# Patient Record
Sex: MALE | Race: WHITE | Employment: OTHER | URBAN - NONMETROPOLITAN AREA
[De-identification: names, ages, dates, MRNs, and addresses within clinical notes are randomized per-mention and may not be internally consistent; named-entity substitution may affect disease eponyms.]

---

## 2017-01-12 ENCOUNTER — OFFICE VISIT (OUTPATIENT)
Dept: FAMILY MEDICINE CLINIC | Age: 62
End: 2017-01-12

## 2017-01-12 VITALS
RESPIRATION RATE: 8 BRPM | WEIGHT: 199.4 LBS | DIASTOLIC BLOOD PRESSURE: 72 MMHG | HEART RATE: 80 BPM | BODY MASS INDEX: 32.05 KG/M2 | SYSTOLIC BLOOD PRESSURE: 134 MMHG | HEIGHT: 66 IN

## 2017-01-12 DIAGNOSIS — G25.0 FAMILIAL TREMOR: Primary | ICD-10-CM

## 2017-01-12 DIAGNOSIS — Z23 NEED FOR INFLUENZA VACCINATION: ICD-10-CM

## 2017-01-12 DIAGNOSIS — Z12.5 SCREENING FOR PROSTATE CANCER: ICD-10-CM

## 2017-01-12 PROCEDURE — 90686 IIV4 VACC NO PRSV 0.5 ML IM: CPT | Performed by: NURSE PRACTITIONER

## 2017-01-12 PROCEDURE — G8427 DOCREV CUR MEDS BY ELIG CLIN: HCPCS | Performed by: NURSE PRACTITIONER

## 2017-01-12 PROCEDURE — 3017F COLORECTAL CA SCREEN DOC REV: CPT | Performed by: NURSE PRACTITIONER

## 2017-01-12 PROCEDURE — 90471 IMMUNIZATION ADMIN: CPT | Performed by: NURSE PRACTITIONER

## 2017-01-12 PROCEDURE — G8419 CALC BMI OUT NRM PARAM NOF/U: HCPCS | Performed by: NURSE PRACTITIONER

## 2017-01-12 PROCEDURE — 4004F PT TOBACCO SCREEN RCVD TLK: CPT | Performed by: NURSE PRACTITIONER

## 2017-01-12 PROCEDURE — G8484 FLU IMMUNIZE NO ADMIN: HCPCS | Performed by: NURSE PRACTITIONER

## 2017-01-12 PROCEDURE — 99203 OFFICE O/P NEW LOW 30 MIN: CPT | Performed by: NURSE PRACTITIONER

## 2017-01-12 RX ORDER — MAG HYDROX/ALUMINUM HYD/SIMETH 400-400-40
1 SUSPENSION, ORAL (FINAL DOSE FORM) ORAL DAILY
COMMUNITY
End: 2018-10-31

## 2017-01-12 ASSESSMENT — PATIENT HEALTH QUESTIONNAIRE - PHQ9
SUM OF ALL RESPONSES TO PHQ QUESTIONS 1-9: 0
1. LITTLE INTEREST OR PLEASURE IN DOING THINGS: 0
2. FEELING DOWN, DEPRESSED OR HOPELESS: 0
SUM OF ALL RESPONSES TO PHQ9 QUESTIONS 1 & 2: 0

## 2017-01-12 ASSESSMENT — ENCOUNTER SYMPTOMS
RESPIRATORY NEGATIVE: 1
GASTROINTESTINAL NEGATIVE: 1
EYES NEGATIVE: 1

## 2017-01-14 LAB
ABSOLUTE BASO #: 0.1 K/UL (ref 0–0.1)
ABSOLUTE EOS #: 0.4 K/UL (ref 0.1–0.4)
ABSOLUTE LYMPH #: 1.7 K/UL (ref 0.8–5.2)
ABSOLUTE MONO #: 0.8 K/UL (ref 0.1–0.9)
ABSOLUTE NEUT #: 3.9 K/UL (ref 1.3–9.1)
ALBUMIN SERPL-MCNC: 4.6 G/DL (ref 3.2–5.3)
ALK PHOSPHATASE: 53 IU/L (ref 35–121)
ALT SERPL-CCNC: 19 IU/L (ref 5–59)
ANION GAP SERPL CALCULATED.3IONS-SCNC: 12 MMOL/L
AST SERPL-CCNC: 15 IU/L (ref 10–42)
BASOPHILS RELATIVE PERCENT: 0.9 % (ref 0–1)
BILIRUB SERPL-MCNC: 0.5 MG/DL (ref 0.2–1.3)
BUN BLDV-MCNC: 15 MG/DL (ref 10–20)
CALCIUM SERPL-MCNC: 9.5 MG/DL (ref 8.7–10.8)
CHLORIDE BLD-SCNC: 102 MMOL/L (ref 95–111)
CHOLESTEROL/HDL RATIO: 4
CHOLESTEROL: 221 MG/DL
CO2: 29 MMOL/L (ref 21–32)
CREAT SERPL-MCNC: 0.8 MG/DL (ref 0.5–1.3)
EGFR AFRICAN AMERICAN: 118
EGFR IF NONAFRICAN AMERICAN: 98
EOSINOPHILS RELATIVE PERCENT: 5.3 % (ref 1–4)
FOLATE: 18.9 NG/ML
GLUCOSE: 92 MG/DL (ref 70–100)
HCT VFR BLD CALC: 40 % (ref 41.4–51)
HDLC SERPL-MCNC: 55 MG/DL (ref 40–60)
HEMOGLOBIN: 13.7 G/DL (ref 13.8–17)
LDL CHOLESTEROL CALCULATED: 137 MG/DL
LDL/HDL RATIO: 2.5
LYMPHOCYTE %: 24.3 % (ref 16–48)
MCH RBC QN AUTO: 29.2 PG (ref 27–34)
MCHC RBC AUTO-ENTMCNC: 34.3 G/DL (ref 31–36)
MCV RBC AUTO: 85.3 FL (ref 80–100)
MONOCYTES # BLD: 11 % (ref 1–8)
NEUTROPHILS RELATIVE PERCENT: 57.8 % (ref 45–75)
PDW BLD-RTO: 13 % (ref 10.8–14.8)
PLATELETS: 225 K/UL (ref 150–450)
POTASSIUM SERPL-SCNC: 4.3 MMOL/L (ref 3.5–5.4)
PSA, ULTRASENSITIVE: 0.46 NG/ML
RBC: 4.69 M/UL (ref 4–5.5)
SODIUM BLD-SCNC: 139 MMOL/L (ref 134–147)
TOTAL PROTEIN: 7.1 G/DL (ref 5.8–8)
TRIGL SERPL-MCNC: 144 MG/DL
TSH SERPL DL<=0.05 MIU/L-ACNC: 3.84 UIU/ML (ref 0.4–4.4)
VITAMIN B-12: 837 PG/ML (ref 211–911)
VLDLC SERPL CALC-MCNC: 29 MG/DL
WBC: 6.8 K/UL (ref 3.7–10.8)

## 2017-01-16 ENCOUNTER — TELEPHONE (OUTPATIENT)
Dept: FAMILY MEDICINE CLINIC | Age: 62
End: 2017-01-16

## 2017-01-16 DIAGNOSIS — E78.5 HYPERLIPIDEMIA, UNSPECIFIED HYPERLIPIDEMIA TYPE: Primary | ICD-10-CM

## 2017-01-16 RX ORDER — ATORVASTATIN CALCIUM 20 MG/1
20 TABLET, FILM COATED ORAL DAILY
Qty: 30 TABLET | Refills: 11 | Status: SHIPPED | OUTPATIENT
Start: 2017-01-16 | End: 2021-08-06 | Stop reason: SDUPTHER

## 2017-01-25 ENCOUNTER — INITIAL CONSULT (OUTPATIENT)
Dept: PHYSICAL MEDICINE AND REHAB | Age: 62
End: 2017-01-25

## 2017-01-25 VITALS
BODY MASS INDEX: 32.14 KG/M2 | SYSTOLIC BLOOD PRESSURE: 145 MMHG | HEIGHT: 66 IN | DIASTOLIC BLOOD PRESSURE: 82 MMHG | WEIGHT: 200 LBS | HEART RATE: 82 BPM

## 2017-01-25 DIAGNOSIS — G25.0 BENIGN ESSENTIAL TREMOR: Primary | ICD-10-CM

## 2017-01-25 PROCEDURE — G8419 CALC BMI OUT NRM PARAM NOF/U: HCPCS | Performed by: PSYCHIATRY & NEUROLOGY

## 2017-01-25 PROCEDURE — 99244 OFF/OP CNSLTJ NEW/EST MOD 40: CPT | Performed by: PSYCHIATRY & NEUROLOGY

## 2017-01-25 PROCEDURE — 3017F COLORECTAL CA SCREEN DOC REV: CPT | Performed by: PSYCHIATRY & NEUROLOGY

## 2017-01-25 PROCEDURE — G8427 DOCREV CUR MEDS BY ELIG CLIN: HCPCS | Performed by: PSYCHIATRY & NEUROLOGY

## 2017-01-25 PROCEDURE — 4004F PT TOBACCO SCREEN RCVD TLK: CPT | Performed by: PSYCHIATRY & NEUROLOGY

## 2017-01-25 PROCEDURE — G8484 FLU IMMUNIZE NO ADMIN: HCPCS | Performed by: PSYCHIATRY & NEUROLOGY

## 2017-01-25 RX ORDER — PRIMIDONE 50 MG/1
50 TABLET ORAL NIGHTLY
Qty: 30 TABLET | Refills: 1 | Status: SHIPPED | OUTPATIENT
Start: 2017-01-25 | End: 2017-01-31 | Stop reason: SDUPTHER

## 2017-01-31 ENCOUNTER — TELEPHONE (OUTPATIENT)
Dept: PHYSICAL MEDICINE AND REHAB | Age: 62
End: 2017-01-31

## 2017-01-31 RX ORDER — PRIMIDONE 50 MG/1
50 TABLET ORAL 2 TIMES DAILY
Qty: 60 TABLET | Refills: 1 | Status: SHIPPED | OUTPATIENT
Start: 2017-01-31 | End: 2017-04-03

## 2017-04-03 ENCOUNTER — OFFICE VISIT (OUTPATIENT)
Dept: PHYSICAL MEDICINE AND REHAB | Age: 62
End: 2017-04-03

## 2017-04-03 VITALS
WEIGHT: 205.8 LBS | HEIGHT: 66 IN | HEART RATE: 70 BPM | SYSTOLIC BLOOD PRESSURE: 145 MMHG | BODY MASS INDEX: 33.07 KG/M2 | DIASTOLIC BLOOD PRESSURE: 78 MMHG

## 2017-04-03 DIAGNOSIS — G25.0 BENIGN ESSENTIAL TREMOR: Primary | ICD-10-CM

## 2017-04-03 PROCEDURE — G8417 CALC BMI ABV UP PARAM F/U: HCPCS | Performed by: PSYCHIATRY & NEUROLOGY

## 2017-04-03 PROCEDURE — 4004F PT TOBACCO SCREEN RCVD TLK: CPT | Performed by: PSYCHIATRY & NEUROLOGY

## 2017-04-03 PROCEDURE — 99213 OFFICE O/P EST LOW 20 MIN: CPT | Performed by: PSYCHIATRY & NEUROLOGY

## 2017-04-03 PROCEDURE — G8427 DOCREV CUR MEDS BY ELIG CLIN: HCPCS | Performed by: PSYCHIATRY & NEUROLOGY

## 2017-04-03 PROCEDURE — 3017F COLORECTAL CA SCREEN DOC REV: CPT | Performed by: PSYCHIATRY & NEUROLOGY

## 2017-04-03 RX ORDER — PRIMIDONE 50 MG/1
50 TABLET ORAL 3 TIMES DAILY
Qty: 90 TABLET | Refills: 5 | Status: SHIPPED | OUTPATIENT
Start: 2017-04-03 | End: 2017-10-05 | Stop reason: SDUPTHER

## 2017-10-05 ENCOUNTER — OFFICE VISIT (OUTPATIENT)
Dept: NEUROLOGY | Age: 62
End: 2017-10-05
Payer: COMMERCIAL

## 2017-10-05 VITALS
HEART RATE: 69 BPM | SYSTOLIC BLOOD PRESSURE: 127 MMHG | BODY MASS INDEX: 32.47 KG/M2 | WEIGHT: 202 LBS | HEIGHT: 66 IN | DIASTOLIC BLOOD PRESSURE: 72 MMHG

## 2017-10-05 DIAGNOSIS — G25.0 BENIGN ESSENTIAL TREMOR: Primary | ICD-10-CM

## 2017-10-05 PROCEDURE — 4004F PT TOBACCO SCREEN RCVD TLK: CPT | Performed by: PSYCHIATRY & NEUROLOGY

## 2017-10-05 PROCEDURE — 3017F COLORECTAL CA SCREEN DOC REV: CPT | Performed by: PSYCHIATRY & NEUROLOGY

## 2017-10-05 PROCEDURE — G8427 DOCREV CUR MEDS BY ELIG CLIN: HCPCS | Performed by: PSYCHIATRY & NEUROLOGY

## 2017-10-05 PROCEDURE — G8417 CALC BMI ABV UP PARAM F/U: HCPCS | Performed by: PSYCHIATRY & NEUROLOGY

## 2017-10-05 PROCEDURE — G8484 FLU IMMUNIZE NO ADMIN: HCPCS | Performed by: PSYCHIATRY & NEUROLOGY

## 2017-10-05 PROCEDURE — 99213 OFFICE O/P EST LOW 20 MIN: CPT | Performed by: PSYCHIATRY & NEUROLOGY

## 2017-10-05 RX ORDER — PRIMIDONE 50 MG/1
100 TABLET ORAL 2 TIMES DAILY
Qty: 120 TABLET | Refills: 7 | Status: SHIPPED | OUTPATIENT
Start: 2017-10-05 | End: 2018-04-24 | Stop reason: SDUPTHER

## 2017-10-05 NOTE — MR AVS SNAPSHOT
After Visit Summary             [address]   10/5/2017 2:45 PM   Office Visit    Description:  Male : 1955   Provider:  Eric Deluca MD   Department:  [facility]              Your Follow-Up and Future Appointments         Below is a list of your follow-up and future appointments. This may not be a complete list as you may have made appointments directly with providers that we are not aware of or your providers may have made some for you. Please call your providers to confirm appointments. It is important to keep your appointments. Please bring your current insurance card, photo ID, co-pay, and all medication bottles to your appointment. If self-pay, payment is expected at the time of service. Your To-Do List     Future Appointments Provider Department Dept Phone    2018 12:30 PM Eric Deluca MD [facility] [phone]    Please arrive 15 minutes prior to appointment, bring photo ID and insurance card. Follow-Up    Return in about 6 months (around 2018). Information from Your Visit        Department     Name Address Phone Fax    [facility] [place] [address]      You Were Seen for:         Comments    Benign essential tremor   [[id number]]         Vital Signs     Blood Pressure Pulse Height Weight Body Mass Index Smoking Status    127/72 (Site: Right Arm, Position: Sitting, Cuff Size: Medium Adult) 71 5' 6\" (1.676 m) 202 lb (91.6 kg) 32.6 kg/m2 Former Smoker      Additional Information about your Body Mass Index (BMI)           Your BMI as listed above is considered obese (30 or more). BMI is an estimate of body fat, calculated from your height and weight. The higher your BMI, the greater your risk of heart disease, high blood pressure, type 2 diabetes, stroke, gallstones, arthritis, sleep apnea, and certain cancers. BMI is not perfect.   It may overestimate body fat in athletes and Influenza, Quadv, 3 yrs and older, IM, Preservative Free 1/12/2017      Preventive Care        Date Due    Hepatitis C screening is recommended for all adults regardless of risk factors born between [facility] at least once (lifetime) who have never been tested. 1955    HIV screening is recommended for all people regardless of risk factors  aged 15-65 years at least once (lifetime) who have never been HIV tested. 1/14/1968    Tetanus Combination Vaccine (1 - Tdap) 1/14/1972    Colonoscopy 1/14/2003    Zoster Vaccine 1/14/2013    Yearly Flu Vaccine (1) 9/1/2017    Cholesterol Screening 1/13/2022            MyChart Signup           Our records indicate that you have declined MyChart signup.

## 2017-10-05 NOTE — PATIENT INSTRUCTIONS
1. Change Primidone to 100 mg twice a day. 2. Follow up in 6 months or sooner if needed. 3. Call if any questions or concerns.

## 2017-10-05 NOTE — PROGRESS NOTES
NEUROLOGY OUT PATIENT FOLLOW UP NOTE:  10/5/22283:18 PM    Milady Silverio is here for follow up for   Patient Active Problem List   Diagnosis    Familial tremor      Follow-up for benign essential tremor . He reports increased tremor, he responds well to twice a day of primidone. He denies new symptoms. He denies any falls, head or voice tremor. ROS:  Respiratory : no cough, no hemoptysis. Cardiac: no chest pain. No palpitations. Renal : no flank pain, no hematuria    Skin: no rash  Reviewed labs since last evaluation and discussed with patient. No Known Allergies    Current Outpatient Prescriptions:     primidone (MYSOLINE) 50 MG tablet, Take 1 tablet by mouth 3 times daily, Disp: 90 tablet, Rfl: 5    atorvastatin (LIPITOR) 20 MG tablet, Take 1 tablet by mouth daily, Disp: 30 tablet, Rfl: 11    Multiple Vitamins-Minerals (CENTRUM SILVER PO), Take 1 tablet by mouth daily, Disp: , Rfl:     NONFORMULARY, 1 tablet daily Indications: protandim(NRF2), Disp: , Rfl:     Saw [place] 450 MG CAPS, Take 1 tablet by mouth daily, Disp: , Rfl:       PE:   Vitals:    10/05/17 1508   BP: 127/72   Site: Right Arm   Position: Sitting   Cuff Size: Medium Adult   Pulse: 69   Weight: 202 lb (91.6 kg)   Height: 5' 6\" (1.676 m)     General Appearance:  alert, cooperative and obese  Skin:  Skin color, texture, turgor normal. No rashes or lesions. Gen: AO3, NAD, Language is Intact. Head: PERRL, EOMI, no icterus  Neck: There is no carotid bruits. The Neck is supple. Neuro: CN 2-12 grossly intact with no focal deficits. Power 5/5 Throughout symmetric, Reflexes are decreased and symmetric. Long tracts are intact. Cerebellar exam is Intact. Sensory exam is intact to light touch. Gait is intact. There is symmetric action tremor noted. There is no head tremor. No voice tremor. Musculoskeletal:  Has no hand arthritis, no limitation of ROM in any of the four extremities. The abdomen is soft, non tender.    Lower extremities no edema        DATA:  Results for orders placed or performed in visit on 01/12/17   Lipid Panel   Result Value Ref Range    Cholesterol 221 (H) <200 mg/dl    Triglycerides 144 <150 mg/dl    HDL 55 40 - 60 mg/dl    LDL Calculated 137 (H) <130 mg/dL    VLDL CHOLESTEROL CALCULATED 29 <30 mg/dL    LDl/HDL Ratio 2.5 <3.5    Chol/HDL Ratio 4.0 <5   Vitamin B12 & Folate   Result Value Ref Range    Vitamin B-12 837 211 - 911 pg/ml    Folate 18.90 ng/ml   Comprehensive Metabolic Panel   Result Value Ref Range    Glucose 92 70 - 100 mg/dl    BUN 15 10 - 20 mg/dl    CREATININE 0.8 0.5 - 1.3 mg/dl    eGFR African American 118 >60    EGFR IF NonAfrican American 98 >60    Calcium 9.5 8.7 - 10.8 mg/dl    Sodium 139 134 - 147 mmol/L    Potassium 4.3 3.5 - 5.4 mmol/L    Chloride 102 95 - 111 mmol/L    CO2 29 21 - 32 mmol/L    Anion Gap 12     Total Bilirubin 0.5 0.2 - 1.3 mg/dl    Alk Phosphatase 53 35 - 121 IU/L    AST 15 10 - 42 IU/L    ALT 19 5 - 59 IU/L    Total Protein 7.1 5.8 - 8.0 g/dl    Alb 4.6 3.2 - 5.3 g/dl   CBC   Result Value Ref Range    WBC 6.8 3.7 - 10.8 K/ul    RBC 4.69 4.00 - 5.50 M/ul    Hemoglobin 13.7 (L) 13.8 - 17.0 g/dL    Hematocrit 40.0 (L) 41.4 - 51.0 %    MCV 85.3 80 - 100 fl    MCH 29.2 27.0 - 34.0 pg    MCHC 34.3 31.0 - 36.0 g/dl    RDW 13.0 10.8 - 14.8 %    Platelets 763 971 - 285 K/ul    Absolute Neut # 3.9 1.3 - 9.1 K/ul    Neutrophils % 57.8 45 - 75 %    Absolute Lymph # 1.7 0.8 - 5.2 K/ul    Lymphocyte % 24.3 16 - 48 %    Absolute Mono # 0.8 0.1 - 0.9 K/ul    Monocytes 11.0 (H) 1 - 8 %    Absolute Eos # 0.4 0.1 - 0.4 K/ul    Eosinophils % 5.3 (H) 1 - 4 %    Absolute Baso # 0.1 0.0 - 0.1 K/ul    Basophils % 0.9 0 - 1 %   PSA 3rd Generation   Result Value Ref Range    PSA, Ultrasensitive 0.46 <4.0 ng/ml   TSH without Reflex   Result Value Ref Range    TSH 3.840 0.40 - 4.40 uIU/mL           Assessment:    1. Benign essential tremor          He reports increased tremor.  No side effects to the medications. His exam shows symmetric action tremor. There is not head or voice tremor. After detailed discussion with patient and his wife we agreed on the following plan. Plan:  1. Change Primidone to 100 mg twice a day. 2. Follow up in 6 months or sooner if needed. 3. Call if any questions or concerns. Please call if any questions.      Abena Gracia MD

## 2018-04-24 ENCOUNTER — OFFICE VISIT (OUTPATIENT)
Dept: NEUROLOGY | Age: 63
End: 2018-04-24
Payer: MEDICARE

## 2018-04-24 VITALS
BODY MASS INDEX: 33.43 KG/M2 | HEART RATE: 68 BPM | WEIGHT: 208 LBS | DIASTOLIC BLOOD PRESSURE: 84 MMHG | SYSTOLIC BLOOD PRESSURE: 128 MMHG | HEIGHT: 66 IN

## 2018-04-24 DIAGNOSIS — G25.0 BENIGN ESSENTIAL TREMOR: Primary | ICD-10-CM

## 2018-04-24 PROCEDURE — 3017F COLORECTAL CA SCREEN DOC REV: CPT | Performed by: PSYCHIATRY & NEUROLOGY

## 2018-04-24 PROCEDURE — 1123F ACP DISCUSS/DSCN MKR DOCD: CPT | Performed by: PSYCHIATRY & NEUROLOGY

## 2018-04-24 PROCEDURE — 99213 OFFICE O/P EST LOW 20 MIN: CPT | Performed by: PSYCHIATRY & NEUROLOGY

## 2018-04-24 PROCEDURE — 4004F PT TOBACCO SCREEN RCVD TLK: CPT | Performed by: PSYCHIATRY & NEUROLOGY

## 2018-04-24 PROCEDURE — 4040F PNEUMOC VAC/ADMIN/RCVD: CPT | Performed by: PSYCHIATRY & NEUROLOGY

## 2018-04-24 PROCEDURE — G8417 CALC BMI ABV UP PARAM F/U: HCPCS | Performed by: PSYCHIATRY & NEUROLOGY

## 2018-04-24 PROCEDURE — G8427 DOCREV CUR MEDS BY ELIG CLIN: HCPCS | Performed by: PSYCHIATRY & NEUROLOGY

## 2018-04-24 RX ORDER — PRIMIDONE 50 MG/1
100 TABLET ORAL 2 TIMES DAILY
Qty: 120 TABLET | Refills: 7 | Status: SHIPPED | OUTPATIENT
Start: 2018-04-24 | End: 2019-04-30 | Stop reason: SDUPTHER

## 2018-04-24 RX ORDER — GABAPENTIN 300 MG/1
300 CAPSULE ORAL DAILY
Qty: 30 CAPSULE | Refills: 3 | Status: SHIPPED | OUTPATIENT
Start: 2018-04-24 | End: 2018-08-22 | Stop reason: SDUPTHER

## 2018-08-22 RX ORDER — GABAPENTIN 300 MG/1
CAPSULE ORAL
Qty: 30 CAPSULE | Refills: 3 | Status: SHIPPED | OUTPATIENT
Start: 2018-08-22 | End: 2018-10-31 | Stop reason: SDUPTHER

## 2018-10-31 ENCOUNTER — OFFICE VISIT (OUTPATIENT)
Dept: NEUROLOGY | Age: 63
End: 2018-10-31
Payer: MEDICARE

## 2018-10-31 VITALS
DIASTOLIC BLOOD PRESSURE: 70 MMHG | SYSTOLIC BLOOD PRESSURE: 132 MMHG | WEIGHT: 214 LBS | HEIGHT: 66 IN | HEART RATE: 68 BPM | BODY MASS INDEX: 34.39 KG/M2

## 2018-10-31 DIAGNOSIS — G25.0 BENIGN ESSENTIAL TREMOR: Primary | ICD-10-CM

## 2018-10-31 PROCEDURE — 99213 OFFICE O/P EST LOW 20 MIN: CPT | Performed by: PSYCHIATRY & NEUROLOGY

## 2018-10-31 PROCEDURE — 3017F COLORECTAL CA SCREEN DOC REV: CPT | Performed by: PSYCHIATRY & NEUROLOGY

## 2018-10-31 PROCEDURE — 1123F ACP DISCUSS/DSCN MKR DOCD: CPT | Performed by: PSYCHIATRY & NEUROLOGY

## 2018-10-31 PROCEDURE — 4004F PT TOBACCO SCREEN RCVD TLK: CPT | Performed by: PSYCHIATRY & NEUROLOGY

## 2018-10-31 PROCEDURE — G8484 FLU IMMUNIZE NO ADMIN: HCPCS | Performed by: PSYCHIATRY & NEUROLOGY

## 2018-10-31 PROCEDURE — 4040F PNEUMOC VAC/ADMIN/RCVD: CPT | Performed by: PSYCHIATRY & NEUROLOGY

## 2018-10-31 PROCEDURE — G8427 DOCREV CUR MEDS BY ELIG CLIN: HCPCS | Performed by: PSYCHIATRY & NEUROLOGY

## 2018-10-31 PROCEDURE — G8417 CALC BMI ABV UP PARAM F/U: HCPCS | Performed by: PSYCHIATRY & NEUROLOGY

## 2018-10-31 PROCEDURE — 1101F PT FALLS ASSESS-DOCD LE1/YR: CPT | Performed by: PSYCHIATRY & NEUROLOGY

## 2018-10-31 RX ORDER — GABAPENTIN 300 MG/1
CAPSULE ORAL
Qty: 60 CAPSULE | Refills: 5 | Status: SHIPPED | OUTPATIENT
Start: 2018-10-31 | End: 2019-05-13 | Stop reason: SDUPTHER

## 2019-04-30 DIAGNOSIS — G25.0 BENIGN ESSENTIAL TREMOR: Primary | ICD-10-CM

## 2019-04-30 RX ORDER — PRIMIDONE 50 MG/1
100 TABLET ORAL 2 TIMES DAILY
Qty: 120 TABLET | Refills: 7 | Status: SHIPPED | OUTPATIENT
Start: 2019-04-30 | End: 2019-05-21 | Stop reason: SDUPTHER

## 2019-05-13 ENCOUNTER — OFFICE VISIT (OUTPATIENT)
Dept: NEUROLOGY | Age: 64
End: 2019-05-13
Payer: MEDICARE

## 2019-05-13 VITALS
WEIGHT: 217 LBS | DIASTOLIC BLOOD PRESSURE: 76 MMHG | HEIGHT: 66 IN | SYSTOLIC BLOOD PRESSURE: 128 MMHG | HEART RATE: 68 BPM | BODY MASS INDEX: 34.87 KG/M2

## 2019-05-13 DIAGNOSIS — G25.0 BENIGN ESSENTIAL TREMOR: Primary | ICD-10-CM

## 2019-05-13 PROCEDURE — G8417 CALC BMI ABV UP PARAM F/U: HCPCS | Performed by: PSYCHIATRY & NEUROLOGY

## 2019-05-13 PROCEDURE — 4040F PNEUMOC VAC/ADMIN/RCVD: CPT | Performed by: PSYCHIATRY & NEUROLOGY

## 2019-05-13 PROCEDURE — 1123F ACP DISCUSS/DSCN MKR DOCD: CPT | Performed by: PSYCHIATRY & NEUROLOGY

## 2019-05-13 PROCEDURE — 99213 OFFICE O/P EST LOW 20 MIN: CPT | Performed by: PSYCHIATRY & NEUROLOGY

## 2019-05-13 PROCEDURE — G8427 DOCREV CUR MEDS BY ELIG CLIN: HCPCS | Performed by: PSYCHIATRY & NEUROLOGY

## 2019-05-13 PROCEDURE — 3017F COLORECTAL CA SCREEN DOC REV: CPT | Performed by: PSYCHIATRY & NEUROLOGY

## 2019-05-13 PROCEDURE — 4004F PT TOBACCO SCREEN RCVD TLK: CPT | Performed by: PSYCHIATRY & NEUROLOGY

## 2019-05-13 RX ORDER — GABAPENTIN 300 MG/1
CAPSULE ORAL
Qty: 90 CAPSULE | Refills: 2 | Status: SHIPPED | OUTPATIENT
Start: 2019-05-13 | End: 2019-09-05 | Stop reason: SDUPTHER

## 2019-05-21 DIAGNOSIS — G25.0 BENIGN ESSENTIAL TREMOR: Primary | ICD-10-CM

## 2019-05-21 RX ORDER — PRIMIDONE 50 MG/1
100 TABLET ORAL 2 TIMES DAILY
Qty: 360 TABLET | Refills: 2 | Status: SHIPPED | OUTPATIENT
Start: 2019-05-21 | End: 2019-12-02 | Stop reason: SDUPTHER

## 2019-05-27 NOTE — PROGRESS NOTES
NEUROLOGY OUT PATIENT FOLLOW UP NOTE: 5/13/20191:03 PM  ?  Nohelia Ramosan is here for follow up for benign essential tremor. He feels his symptoms are worse since last evaluation. He can struggle with using utensils to eat. He is currently on primidone and Neurontin. No side effects to the medication. He comes in with his wife to discuss medication options and the plan of care going forward. ?  ?  ?  ROS:  Cardiac: no chest pain. No palpitations. Renal : no flank pain, no hematuria   Skin: no rash  ? No Known Allergies  Current Outpatient Medications:    primidone (MYSOLINE) 50 MG tablet, Take 2 tablets by mouth 2 times daily, Disp: 120 tablet, Rfl: 7   gabapentin (NEURONTIN) 300 MG capsule, Take one capsule twice a day. ., Disp: 60 capsule, Rfl: 5   Multiple Vitamins-Minerals (CENTRUM SILVER PO), Take 1 tablet by mouth daily, Disp: , Rfl:    NONFORMULARY, 1 tablet daily Indications: protandim(NRF2), Disp: , Rfl:    atorvastatin (LIPITOR) 20 MG tablet, Take 1 tablet by mouth daily, Disp: 30 tablet, Rfl: 11  ? PE:   Vitals:   ? 05/13/19 1255   BP: 128/76   Site: Left Upper Arm   Position: Sitting   Cuff Size: Medium Adult   Pulse: 68   Weight: 217 lb (98.4 kg)   Height: 5' 6\" (1.676 m)   General Appearance: awake, alert, oriented, in no acute distress  Gen: NAD, Language is Intact. Head: no rash, no icterus  Neck: There is no carotid bruits. The Neck is supple. Neuro: CN 2-12 grossly intact with no focal deficits. Power 5/5 Throughout symmetric, Reflexes are decreased and symmetric. Long tracts are intact. Cerebellar exam is Intact. Sensory exam is intact to light touch. Gait is intact. He has symmetric bilateral action tremor noted on exam. No head or voice tremor. Musculoskeletal: Has no hand arthritis, no limitation of ROM in any of the four extremities. Lower extremities no edema  ? ? DATA:  Results for orders placed or performed in visit on 01/12/17   Lipid Panel   Result Value Ref Range   ? Cholesterol 221 (H) <200 mg/dl   ? Triglycerides 144 <150 mg/dl   ? HDL 55 40 - 60 mg/dl   ? LDL Calculated 137 (H) <130 mg/dL   ? VLDL Cholesterol Calculated 29 <30 mg/dL   ? LDl/HDL Ratio 2.5 <3.5   ? Chol/HDL Ratio 4.0 <5   Vitamin B12 & Folate   Result Value Ref Range   ? Vitamin B-12 837 211 - 911 pg/ml   ? Folate 18.90 ng/ml   Comprehensive Metabolic Panel   Result Value Ref Range   ? Glucose 92 70 - 100 mg/dl   ? BUN 15 10 - 20 mg/dl   ? CREATININE 0.8 0.5 - 1.3 mg/dl   ? eGFR  118 >60   ? EGFR IF NonAfrican American 98 >60   ? Calcium 9.5 8.7 - 10.8 mg/dl   ? Sodium 139 134 - 147 mmol/L   ? Potassium 4.3 3.5 - 5.4 mmol/L   ? Chloride 102 95 - 111 mmol/L   ? CO2 29 21 - 32 mmol/L   ? Anion Gap 12 ?   ? Total Bilirubin 0.5 0.2 - 1.3 mg/dl   ? Alk Phosphatase 53 35 - 121 IU/L   ? AST 15 10 - 42 IU/L   ? ALT 19 5 - 59 IU/L   ? Total Protein 7.1 5.8 - 8.0 g/dl   ? Alb 4.6 3.2 - 5.3 g/dl   CBC   Result Value Ref Range   ? WBC 6.8 3.7 - 10.8 K/ul   ? RBC 4.69 4.00 - 5.50 M/ul   ? Hemoglobin 13.7 (L) 13.8 - 17.0 g/dL   ? Hematocrit 40.0 (L) 41.4 - 51.0 %   ? MCV 85.3 80 - 100 fl   ? MCH 29.2 27.0 - 34.0 pg   ? MCHC 34.3 31.0 - 36.0 g/dl   ? RDW 13.0 10.8 - 14.8 %   ? Platelets 771 304 - 244 K/ul   ? Absolute Neut # 3.9 1.3 - 9.1 K/ul   ? Neutrophils % 57.8 45 - 75 %   ? Absolute Lymph # 1.7 0.8 - 5.2 K/ul   ? Lymphocyte % 24.3 16 - 48 %   ? Absolute Mono # 0.8 0.1 - 0.9 K/ul   ? Monocytes 11.0 (H) 1 - 8 %   ? Absolute Eos # 0.4 0.1 - 0.4 K/ul   ? Eosinophils % 5.3 (H) 1 - 4 %   ? Absolute Baso # 0.1 0.0 - 0.1 K/ul   ? Basophils % 0.9 0 - 1 %   PSA 3rd Generation   Result Value Ref Range   ? PSA, Ultrasensitive 0.46 <4.0 ng/ml   TSH without Reflex   Result Value Ref Range   ? TSH 3.840 0.40 - 4.40 uIU/mL   ? ? Assessment:  ? Diagnosis Orders   1. Benign essential tremor  ? ? He is here for follow up for benign essential tremor. He feels his symptoms are worse since last evaluation.  He can struggle with using utensils to eat. He is currently on primidone and Neurontin. No side effects to the medication. There is no head or voice tremor. After detailed discussion with patient and his wife we agreed on the following plan. ?  ?  Plan:  1. Change Neurontin to 300 mg three times?daily. 2. Continue Primidone 100 mg twice a day. Refills given. 3. Follow up in 6 months or sooner if needed. 4. Call if any questions or concerns. ?  ?  Please call if any questions.        Merrill Walton MD

## 2019-09-05 DIAGNOSIS — G25.0 BENIGN ESSENTIAL TREMOR: Primary | ICD-10-CM

## 2019-09-05 RX ORDER — GABAPENTIN 300 MG/1
CAPSULE ORAL
Qty: 270 CAPSULE | Refills: 1 | Status: SHIPPED | OUTPATIENT
Start: 2019-09-05 | End: 2019-12-02

## 2019-12-02 ENCOUNTER — OFFICE VISIT (OUTPATIENT)
Dept: NEUROLOGY | Age: 64
End: 2019-12-02
Payer: MEDICARE

## 2019-12-02 VITALS
DIASTOLIC BLOOD PRESSURE: 80 MMHG | HEART RATE: 76 BPM | WEIGHT: 218 LBS | HEIGHT: 63 IN | BODY MASS INDEX: 38.62 KG/M2 | SYSTOLIC BLOOD PRESSURE: 122 MMHG

## 2019-12-02 DIAGNOSIS — G25.0 BENIGN ESSENTIAL TREMOR: Primary | ICD-10-CM

## 2019-12-02 PROCEDURE — G8484 FLU IMMUNIZE NO ADMIN: HCPCS | Performed by: PSYCHIATRY & NEUROLOGY

## 2019-12-02 PROCEDURE — G8427 DOCREV CUR MEDS BY ELIG CLIN: HCPCS | Performed by: PSYCHIATRY & NEUROLOGY

## 2019-12-02 PROCEDURE — 99213 OFFICE O/P EST LOW 20 MIN: CPT | Performed by: PSYCHIATRY & NEUROLOGY

## 2019-12-02 PROCEDURE — 4040F PNEUMOC VAC/ADMIN/RCVD: CPT | Performed by: PSYCHIATRY & NEUROLOGY

## 2019-12-02 PROCEDURE — 3017F COLORECTAL CA SCREEN DOC REV: CPT | Performed by: PSYCHIATRY & NEUROLOGY

## 2019-12-02 PROCEDURE — 1123F ACP DISCUSS/DSCN MKR DOCD: CPT | Performed by: PSYCHIATRY & NEUROLOGY

## 2019-12-02 PROCEDURE — 4004F PT TOBACCO SCREEN RCVD TLK: CPT | Performed by: PSYCHIATRY & NEUROLOGY

## 2019-12-02 PROCEDURE — G8417 CALC BMI ABV UP PARAM F/U: HCPCS | Performed by: PSYCHIATRY & NEUROLOGY

## 2019-12-02 RX ORDER — PRIMIDONE 50 MG/1
100 TABLET ORAL 2 TIMES DAILY
Qty: 360 TABLET | Refills: 2 | Status: SHIPPED | OUTPATIENT
Start: 2019-12-02 | End: 2020-12-11 | Stop reason: SDUPTHER

## 2019-12-02 RX ORDER — GABAPENTIN 400 MG/1
400 CAPSULE ORAL 3 TIMES DAILY
Qty: 90 CAPSULE | Refills: 9 | Status: SHIPPED | OUTPATIENT
Start: 2019-12-02 | End: 2020-06-03

## 2020-06-03 ENCOUNTER — OFFICE VISIT (OUTPATIENT)
Dept: NEUROLOGY | Age: 65
End: 2020-06-03
Payer: MEDICARE

## 2020-06-03 VITALS
HEART RATE: 75 BPM | BODY MASS INDEX: 33.37 KG/M2 | HEIGHT: 66 IN | DIASTOLIC BLOOD PRESSURE: 80 MMHG | SYSTOLIC BLOOD PRESSURE: 124 MMHG | WEIGHT: 207.6 LBS | OXYGEN SATURATION: 94 %

## 2020-06-03 PROCEDURE — 4040F PNEUMOC VAC/ADMIN/RCVD: CPT | Performed by: NURSE PRACTITIONER

## 2020-06-03 PROCEDURE — 4004F PT TOBACCO SCREEN RCVD TLK: CPT | Performed by: NURSE PRACTITIONER

## 2020-06-03 PROCEDURE — 1123F ACP DISCUSS/DSCN MKR DOCD: CPT | Performed by: NURSE PRACTITIONER

## 2020-06-03 PROCEDURE — G8427 DOCREV CUR MEDS BY ELIG CLIN: HCPCS | Performed by: NURSE PRACTITIONER

## 2020-06-03 PROCEDURE — 99213 OFFICE O/P EST LOW 20 MIN: CPT | Performed by: NURSE PRACTITIONER

## 2020-06-03 PROCEDURE — 3017F COLORECTAL CA SCREEN DOC REV: CPT | Performed by: NURSE PRACTITIONER

## 2020-06-03 PROCEDURE — G8417 CALC BMI ABV UP PARAM F/U: HCPCS | Performed by: NURSE PRACTITIONER

## 2020-06-03 RX ORDER — TRIHEXYPHENIDYL HYDROCHLORIDE 2 MG/1
1 TABLET ORAL DAILY
Qty: 30 TABLET | Refills: 1 | Status: SHIPPED | OUTPATIENT
Start: 2020-06-03 | End: 2020-12-03 | Stop reason: SDUPTHER

## 2020-06-03 NOTE — PROGRESS NOTES
NEUROLOGY OUT PATIENT FOLLOW UP NOTE:  6/3/11036:54 PM    Rhina Her is here for follow up for benign essential tremor. His tremor is the same, he feels there is room for improvement. He is on primidone and Neurontin. He denies any side effects to the medications. He comes in today by himself to discuss medication options and the plan of care going forward. ROS:  Respiratory : no cough, no shortness of breath  Cardiac: no chest pain. No palpitations. Renal : no flank pain, no hematuria    Skin: no rash      No Known Allergies    Current Outpatient Medications:     primidone (MYSOLINE) 50 MG tablet, Take 2 tablets by mouth 2 times daily, Disp: 360 tablet, Rfl: 2    gabapentin (NEURONTIN) 400 MG capsule, Take 1 capsule by mouth 3 times daily for 90 days. , Disp: 90 capsule, Rfl: 9    atorvastatin (LIPITOR) 20 MG tablet, Take 1 tablet by mouth daily, Disp: 30 tablet, Rfl: 11    Multiple Vitamins-Minerals (CENTRUM SILVER PO), Take 1 tablet by mouth daily, Disp: , Rfl:     NONFORMULARY, 1 tablet daily Indications: protandim(NRF2), Disp: , Rfl:       PE:   Vitals:    06/03/20 1549   BP: 124/80   Site: Left Upper Arm   Position: Sitting   Cuff Size: Small Adult   Pulse: 75   SpO2: 94%   Weight: 207 lb 9.6 oz (94.2 kg)   Height: 5' 6\" (1.676 m)     General Appearance:  awake, alert, oriented, in no acute distress  Gen: NAD, Language is Intact. Skin: no rash, lesion, dry  to touch. warm  Head: no rash, no icterus  Neck: There is no carotid bruits. The Neck is supple. There is no neck lymphadenopathy. Neuro: CN 2-12 grossly intact with no focal deficits. Power 5/5 Throughout symmetric, Reflexes are symmetric. Long tracts are intact. Cerebellar exam is Intact. Sensory exam is intact to light touch. Gait is intact. There is symmetric bilateral action tremor I hands. No voice or head tremor  Musculoskeletal:  Has no hand arthritis, no limitation of ROM in any of the four extremities.   Lower extremities no edema  The abdomen is soft,  intact bowel sounds. DATA:  Results for orders placed or performed in visit on 01/12/17   Lipid Panel   Result Value Ref Range    Cholesterol 221 (H) <200 mg/dl    Triglycerides 144 <150 mg/dl    HDL 55 40 - 60 mg/dl    LDL Calculated 137 (H) <130 mg/dL    VLDL Cholesterol Calculated 29 <30 mg/dL    LDl/HDL Ratio 2.5 <3.5    Chol/HDL Ratio 4.0 <5   Vitamin B12 & Folate   Result Value Ref Range    Vitamin B-12 837 211 - 911 pg/ml    Folate 18.90 ng/ml   Comprehensive Metabolic Panel   Result Value Ref Range    Glucose 92 70 - 100 mg/dl    BUN 15 10 - 20 mg/dl    CREATININE 0.8 0.5 - 1.3 mg/dl    eGFR African American 118 >60    EGFR IF NonAfrican American 98 >60    Calcium 9.5 8.7 - 10.8 mg/dl    Sodium 139 134 - 147 mmol/L    Potassium 4.3 3.5 - 5.4 mmol/L    Chloride 102 95 - 111 mmol/L    CO2 29 21 - 32 mmol/L    Anion Gap 12     Total Bilirubin 0.5 0.2 - 1.3 mg/dl    Alk Phosphatase 53 35 - 121 IU/L    AST 15 10 - 42 IU/L    ALT 19 5 - 59 IU/L    Total Protein 7.1 5.8 - 8.0 g/dl    Alb 4.6 3.2 - 5.3 g/dl   CBC   Result Value Ref Range    WBC 6.8 3.7 - 10.8 K/ul    RBC 4.69 4.00 - 5.50 M/ul    Hemoglobin 13.7 (L) 13.8 - 17.0 g/dL    Hematocrit 40.0 (L) 41.4 - 51.0 %    MCV 85.3 80 - 100 fl    MCH 29.2 27.0 - 34.0 pg    MCHC 34.3 31.0 - 36.0 g/dl    RDW 13.0 10.8 - 14.8 %    Platelets 088 962 - 493 K/ul    Absolute Neut # 3.9 1.3 - 9.1 K/ul    Neutrophils % 57.8 45 - 75 %    Absolute Lymph # 1.7 0.8 - 5.2 K/ul    Lymphocyte % 24.3 16 - 48 %    Absolute Mono # 0.8 0.1 - 0.9 K/ul    Monocytes 11.0 (H) 1 - 8 %    Absolute Eos # 0.4 0.1 - 0.4 K/ul    Eosinophils % 5.3 (H) 1 - 4 %    Absolute Baso # 0.1 0.0 - 0.1 K/ul    Basophils % 0.9 0 - 1 %   PSA 3rd Generation   Result Value Ref Range    PSA, Ultrasensitive 0.46 <4.0 ng/ml   TSH without Reflex   Result Value Ref Range    TSH 3.840 0.40 - 4.40 uIU/mL           Assessment:     Diagnosis Orders   1.  Benign essential tremor

## 2020-06-03 NOTE — PATIENT INSTRUCTIONS
1. Start Artane 1 mg daily   2. Stop Gabapentin due to lack of benefit. 3. Continue Primidone 100 mg twice a day. Refills given. 4. Please call us in 1-2 weeks to give us an update as to how you are doing. 5. Follow up in 6 months or sooner if needed. 6. Call if any questions or concerns.

## 2020-12-03 RX ORDER — TRIHEXYPHENIDYL HYDROCHLORIDE 2 MG/1
1 TABLET ORAL DAILY
Qty: 30 TABLET | Refills: 1 | Status: SHIPPED | OUTPATIENT
Start: 2020-12-03 | End: 2021-08-06

## 2020-12-11 ENCOUNTER — OFFICE VISIT (OUTPATIENT)
Dept: NEUROLOGY | Age: 65
End: 2020-12-11
Payer: MEDICARE

## 2020-12-11 VITALS
WEIGHT: 190 LBS | HEART RATE: 72 BPM | OXYGEN SATURATION: 95 % | BODY MASS INDEX: 30.53 KG/M2 | HEIGHT: 66 IN | DIASTOLIC BLOOD PRESSURE: 72 MMHG | SYSTOLIC BLOOD PRESSURE: 122 MMHG

## 2020-12-11 PROCEDURE — G8427 DOCREV CUR MEDS BY ELIG CLIN: HCPCS | Performed by: NURSE PRACTITIONER

## 2020-12-11 PROCEDURE — G8484 FLU IMMUNIZE NO ADMIN: HCPCS | Performed by: NURSE PRACTITIONER

## 2020-12-11 PROCEDURE — 4004F PT TOBACCO SCREEN RCVD TLK: CPT | Performed by: NURSE PRACTITIONER

## 2020-12-11 PROCEDURE — 4040F PNEUMOC VAC/ADMIN/RCVD: CPT | Performed by: NURSE PRACTITIONER

## 2020-12-11 PROCEDURE — 3017F COLORECTAL CA SCREEN DOC REV: CPT | Performed by: NURSE PRACTITIONER

## 2020-12-11 PROCEDURE — G8417 CALC BMI ABV UP PARAM F/U: HCPCS | Performed by: NURSE PRACTITIONER

## 2020-12-11 PROCEDURE — 99213 OFFICE O/P EST LOW 20 MIN: CPT | Performed by: NURSE PRACTITIONER

## 2020-12-11 PROCEDURE — 1123F ACP DISCUSS/DSCN MKR DOCD: CPT | Performed by: NURSE PRACTITIONER

## 2020-12-11 RX ORDER — PRIMIDONE 50 MG/1
100 TABLET ORAL 2 TIMES DAILY
Qty: 360 TABLET | Refills: 2 | Status: SHIPPED | OUTPATIENT
Start: 2020-12-11 | End: 2021-06-11

## 2020-12-11 NOTE — PATIENT INSTRUCTIONS
1. Stop Artane  2. Continue Primidone 100 mg twice a day. Refills given. 3. Please call us in 1-2 weeks to give us an update as to how you are doing. 4. Follow up in 6 months or sooner if needed  5. Call if any questions or concerns.

## 2020-12-11 NOTE — PROGRESS NOTES
NEUROLOGY OUT PATIENT FOLLOW UP NOTE:  12/11/20203:20 PM    Néstor Keita is here for follow up for essential tremor. He feels his tremor is the same. He is on artane and does not feel this has helped much with the tremor. He is also on primidone. He is tolerating the medications well, no side effects noted. There is no head or voice tremor. He comes in today by himself to discuss medication options as well as the plan of care going forward. ROS:  Respiratory : no cough, no shortness of breath  Cardiac: no chest pain. No palpitations. Renal : no flank pain, no hematuria    Skin: no rash      No Known Allergies    Current Outpatient Medications:     trihexyphenidyl (ARTANE) 2 MG tablet, Take 0.5 tablets by mouth daily, Disp: 30 tablet, Rfl: 1    primidone (MYSOLINE) 50 MG tablet, Take 2 tablets by mouth 2 times daily, Disp: 360 tablet, Rfl: 2    Multiple Vitamins-Minerals (CENTRUM SILVER PO), Take 1 tablet by mouth daily, Disp: , Rfl:     atorvastatin (LIPITOR) 20 MG tablet, Take 1 tablet by mouth daily, Disp: 30 tablet, Rfl: 11    NONFORMULARY, 1 tablet daily Indications: protandim(NRF2), Disp: , Rfl:     I reviewed the past medical history, allergies, medications, social history and family history. PE:   Vitals:    12/11/20 1515   BP: 122/72   Site: Left Upper Arm   Position: Sitting   Cuff Size: Large Adult   Pulse: 72   SpO2: 95%   Weight: 190 lb (86.2 kg)   Height: 5' 6\" (1.676 m)     General Appearance:  awake, alert, oriented, in no acute distress  Gen: NAD, Language is Intact. Skin: no rash, lesion, dry  to touch. warm  Head: no rash, no icterus  Neck: There is no carotid bruits. The Neck is supple. There is no neck lymphadenopathy. Neuro: CN 2-12 grossly intact with no focal deficits. Power 5/5 Throughout symmetric, Reflexes are symmetric. Long tracts are intact. Cerebellar exam is Intact. Sensory exam is intact to light touch. Gait is intact.  There is mild symmetric action tremor noted in bilateral hands. Musculoskeletal:  Has no hand arthritis, no limitation of ROM in any of the four extremities  Lower extremities no edema  The abdomen is soft,  intact bowel sounds.          DATA:  Results for orders placed or performed in visit on 01/12/17   Lipid Panel   Result Value Ref Range    Cholesterol 221 (H) <200 mg/dl    Triglycerides 144 <150 mg/dl    HDL 55 40 - 60 mg/dl    LDL Calculated 137 (H) <130 mg/dL    VLDL Cholesterol Calculated 29 <30 mg/dL    LDl/HDL Ratio 2.5 <3.5    Chol/HDL Ratio 4.0 <5   Vitamin B12 & Folate   Result Value Ref Range    Vitamin B-12 837 211 - 911 pg/ml    Folate 18.90 ng/ml   Comprehensive Metabolic Panel   Result Value Ref Range    Glucose 92 70 - 100 mg/dl    BUN 15 10 - 20 mg/dl    CREATININE 0.8 0.5 - 1.3 mg/dl    eGFR African American 118 >60    EGFR IF NonAfrican American 98 >60    Calcium 9.5 8.7 - 10.8 mg/dl    Sodium 139 134 - 147 mmol/L    Potassium 4.3 3.5 - 5.4 mmol/L    Chloride 102 95 - 111 mmol/L    CO2 29 21 - 32 mmol/L    Anion Gap 12     Total Bilirubin 0.5 0.2 - 1.3 mg/dl    Alk Phosphatase 53 35 - 121 IU/L    AST 15 10 - 42 IU/L    ALT 19 5 - 59 IU/L    Total Protein 7.1 5.8 - 8.0 g/dl    Alb 4.6 3.2 - 5.3 g/dl   CBC   Result Value Ref Range    WBC 6.8 3.7 - 10.8 K/ul    RBC 4.69 4.00 - 5.50 M/ul    Hemoglobin 13.7 (L) 13.8 - 17.0 g/dL    Hematocrit 40.0 (L) 41.4 - 51.0 %    MCV 85.3 80 - 100 fl    MCH 29.2 27.0 - 34.0 pg    MCHC 34.3 31.0 - 36.0 g/dl    RDW 13.0 10.8 - 14.8 %    Platelets 251 782 - 456 K/ul    Absolute Neut # 3.9 1.3 - 9.1 K/ul    Neutrophils % 57.8 45 - 75 %    Absolute Lymph # 1.7 0.8 - 5.2 K/ul    Lymphocyte % 24.3 16 - 48 %    Absolute Mono # 0.8 0.1 - 0.9 K/ul    Monocytes 11.0 (H) 1 - 8 %    Absolute Eos # 0.4 0.1 - 0.4 K/ul    Eosinophils % 5.3 (H) 1 - 4 %    Absolute Baso # 0.1 0.0 - 0.1 K/ul    Basophils % 0.9 0 - 1 %   PSA 3rd Generation   Result Value Ref Range    PSA, Ultrasensitive 0.46 <4.0 ng/ml   TSH without Reflex   Result Value Ref Range    TSH 3.840 0.40 - 4.40 uIU/mL               Assessment:     Diagnosis Orders   1. Benign essential tremor          He feels his tremor is the same. He is on artane and does not feel this has helped much with the tremor. He is also on primidone. He is tolerating the medications well, no side effects noted. There is no head or voice tremor. After detailed discussion with patient we agreed on the following plan. Plan:  1. Stop Artane  2. Continue Primidone 100 mg twice a day. Refills given. 3. Please call us in 1-2 weeks to give us an update as to how you are doing. 4. Follow up in 6 months or sooner if needed  5. Call if any questions or concerns. Time spent evaluating patient, reviewing records, counseling with more than 50% of the time spent for counseling was 15 min.     Oleg Vizcarra CNP

## 2021-06-11 ENCOUNTER — OFFICE VISIT (OUTPATIENT)
Dept: NEUROLOGY | Age: 66
End: 2021-06-11
Payer: MEDICARE

## 2021-06-11 VITALS
SYSTOLIC BLOOD PRESSURE: 122 MMHG | WEIGHT: 191 LBS | BODY MASS INDEX: 29.98 KG/M2 | HEART RATE: 71 BPM | DIASTOLIC BLOOD PRESSURE: 68 MMHG | OXYGEN SATURATION: 95 % | HEIGHT: 67 IN

## 2021-06-11 DIAGNOSIS — G25.0 BENIGN ESSENTIAL TREMOR: Primary | ICD-10-CM

## 2021-06-11 PROCEDURE — 1123F ACP DISCUSS/DSCN MKR DOCD: CPT | Performed by: PSYCHIATRY & NEUROLOGY

## 2021-06-11 PROCEDURE — G8427 DOCREV CUR MEDS BY ELIG CLIN: HCPCS | Performed by: PSYCHIATRY & NEUROLOGY

## 2021-06-11 PROCEDURE — G8417 CALC BMI ABV UP PARAM F/U: HCPCS | Performed by: PSYCHIATRY & NEUROLOGY

## 2021-06-11 PROCEDURE — 99213 OFFICE O/P EST LOW 20 MIN: CPT | Performed by: PSYCHIATRY & NEUROLOGY

## 2021-06-11 PROCEDURE — 4040F PNEUMOC VAC/ADMIN/RCVD: CPT | Performed by: PSYCHIATRY & NEUROLOGY

## 2021-06-11 PROCEDURE — 3017F COLORECTAL CA SCREEN DOC REV: CPT | Performed by: PSYCHIATRY & NEUROLOGY

## 2021-06-11 PROCEDURE — 4004F PT TOBACCO SCREEN RCVD TLK: CPT | Performed by: PSYCHIATRY & NEUROLOGY

## 2021-06-11 RX ORDER — PRIMIDONE 50 MG/1
100 TABLET ORAL 3 TIMES DAILY
Qty: 180 TABLET | Refills: 5 | Status: SHIPPED | OUTPATIENT
Start: 2021-06-11 | End: 2021-08-06 | Stop reason: SDUPTHER

## 2021-06-11 NOTE — PROGRESS NOTES
to light touch. Gait is intact. There is symmetric action tremor noted in bilateral hands. There is no head tremor, no voice tremor. Musculoskeletal:  Has no hand arthritis, no limitation of ROM in any of the four extremities  Lower extremities no edema  The abdomen is soft,  intact bowel sounds.          DATA:  Results for orders placed or performed in visit on 01/12/17   Lipid Panel   Result Value Ref Range    Cholesterol 221 (H) <200 mg/dl    Triglycerides 144 <150 mg/dl    HDL 55 40 - 60 mg/dl    LDL Calculated 137 (H) <130 mg/dL    VLDL Cholesterol Calculated 29 <30 mg/dL    LDl/HDL Ratio 2.5 <3.5    Chol/HDL Ratio 4.0 <5   Vitamin B12 & Folate   Result Value Ref Range    Vitamin B-12 837 211 - 911 pg/ml    Folate 18.90 ng/ml   Comprehensive Metabolic Panel   Result Value Ref Range    Glucose 92 70 - 100 mg/dl    BUN 15 10 - 20 mg/dl    CREATININE 0.8 0.5 - 1.3 mg/dl    eGFR African American 118 >60    EGFR IF NonAfrican American 98 >60    Calcium 9.5 8.7 - 10.8 mg/dl    Sodium 139 134 - 147 mmol/L    Potassium 4.3 3.5 - 5.4 mmol/L    Chloride 102 95 - 111 mmol/L    CO2 29 21 - 32 mmol/L    Anion Gap 12     Total Bilirubin 0.5 0.2 - 1.3 mg/dl    Alk Phosphatase 53 35 - 121 IU/L    AST 15 10 - 42 IU/L    ALT 19 5 - 59 IU/L    Total Protein 7.1 5.8 - 8.0 g/dl    Albumin 4.6 3.2 - 5.3 g/dl   CBC   Result Value Ref Range    WBC 6.8 3.7 - 10.8 K/ul    RBC 4.69 4.00 - 5.50 M/ul    Hemoglobin 13.7 (L) 13.8 - 17.0 g/dL    Hematocrit 40.0 (L) 41.4 - 51.0 %    MCV 85.3 80 - 100 fl    MCH 29.2 27.0 - 34.0 pg    MCHC 34.3 31.0 - 36.0 g/dl    RDW 13.0 10.8 - 14.8 %    Platelets 278 790 - 203 K/ul    Absolute Neut # 3.9 1.3 - 9.1 K/ul    Neutrophils % 57.8 45 - 75 %    Absolute Lymph # 1.7 0.8 - 5.2 K/ul    Lymphocyte % 24.3 16 - 48 %    Absolute Mono # 0.8 0.1 - 0.9 K/ul    Monocytes 11.0 (H) 1 - 8 %    Absolute Eos # 0.4 0.1 - 0.4 K/ul    Eosinophils % 5.3 (H) 1 - 4 %    Absolute Baso # 0.1 0.0 - 0.1 K/ul    Basophils % 0.9 0 - 1 %   PSA 3rd Generation   Result Value Ref Range    PSA, Ultrasensitive 0.46 <4.0 ng/ml   TSH without Reflex   Result Value Ref Range    TSH 3.840 0.40 - 4.40 uIU/mL               Assessment:     Diagnosis Orders   1. Benign essential tremor          Patient is seen as follow-up for benign essential tremor, his tremor is worse compared to last evaluation, he is currently on primidone 100 mg twice a day tolerated well no side effects reported. There is no head, or voice tremor noted. I reviewed the patient pertinent labs and records in the EHR and from other providers. Of note the patient was not Artane before and he did not feel it helped much with the symptoms. We discussed changing the dose of primidone to 100 mg 3 times a day. The patient is compliant, no side effects reported. After detailed discussion with patient we agreed on the following plan. Plan:  1. Change Primidone to 100 mg three time a day. Refills given. 2. Please call us in 2 weeks to give us an update as to how you are doing. 3. Follow up in 6 months or sooner if needed  4. Call if any questions or concerns.          Total Time 23 min    Zhane Tracy MD

## 2021-06-11 NOTE — PATIENT INSTRUCTIONS
1. Change Primidone to 100 mg three time a day. Refills given. 2. Please call us in 2 weeks to give us an update as to how you are doing. 3. Follow up in 6 months or sooner if needed  4. Call if any questions or concerns.

## 2021-08-05 PROBLEM — E78.5 HYPERLIPIDEMIA: Status: ACTIVE | Noted: 2021-08-05

## 2022-08-22 ENCOUNTER — HOSPITAL ENCOUNTER (OUTPATIENT)
Dept: CT IMAGING | Age: 67
Discharge: HOME | End: 2022-08-22
Payer: MEDICARE

## 2022-08-22 DIAGNOSIS — R31.9 HEMATURIA, UNSPECIFIED TYPE: ICD-10-CM

## 2022-08-22 PROCEDURE — 6360000004 HC RX CONTRAST MEDICATION: Performed by: FAMILY MEDICINE

## 2022-08-22 PROCEDURE — 74178 CT ABD&PLV WO CNTR FLWD CNTR: CPT | Performed by: FAMILY MEDICINE

## 2022-08-22 RX ADMIN — IOPAMIDOL 100 ML: 755 INJECTION, SOLUTION INTRAVENOUS at 11:33

## 2022-08-30 ENCOUNTER — OFFICE VISIT (OUTPATIENT)
Dept: UROLOGY | Age: 67
End: 2022-08-30
Payer: MEDICARE

## 2022-08-30 VITALS
BODY MASS INDEX: 32.3 KG/M2 | HEIGHT: 66 IN | SYSTOLIC BLOOD PRESSURE: 128 MMHG | WEIGHT: 201 LBS | DIASTOLIC BLOOD PRESSURE: 68 MMHG

## 2022-08-30 DIAGNOSIS — R31.29 MICROSCOPIC HEMATURIA: Primary | ICD-10-CM

## 2022-08-30 LAB
BILIRUBIN URINE: NEGATIVE
BLOOD URINE, POC: ABNORMAL
CHARACTER, URINE: CLEAR
COLOR, URINE: YELLOW
GLUCOSE URINE: NEGATIVE MG/DL
KETONES, URINE: NEGATIVE
LEUKOCYTE CLUMPS, URINE: NEGATIVE
NITRITE, URINE: NEGATIVE
PH, URINE: 5.5 (ref 5–9)
PROTEIN, URINE: NEGATIVE MG/DL
SPECIFIC GRAVITY, URINE: 1.02 (ref 1–1.03)
UROBILINOGEN, URINE: 0.2 EU/DL (ref 0–1)

## 2022-08-30 PROCEDURE — 81003 URINALYSIS AUTO W/O SCOPE: CPT | Performed by: NURSE PRACTITIONER

## 2022-08-30 PROCEDURE — 4004F PT TOBACCO SCREEN RCVD TLK: CPT | Performed by: NURSE PRACTITIONER

## 2022-08-30 PROCEDURE — G8417 CALC BMI ABV UP PARAM F/U: HCPCS | Performed by: NURSE PRACTITIONER

## 2022-08-30 PROCEDURE — G8427 DOCREV CUR MEDS BY ELIG CLIN: HCPCS | Performed by: NURSE PRACTITIONER

## 2022-08-30 PROCEDURE — 99204 OFFICE O/P NEW MOD 45 MIN: CPT | Performed by: NURSE PRACTITIONER

## 2022-08-30 PROCEDURE — 3017F COLORECTAL CA SCREEN DOC REV: CPT | Performed by: NURSE PRACTITIONER

## 2022-08-30 PROCEDURE — 1123F ACP DISCUSS/DSCN MKR DOCD: CPT | Performed by: NURSE PRACTITIONER

## 2022-08-30 ASSESSMENT — ENCOUNTER SYMPTOMS
NAUSEA: 0
BACK PAIN: 0
ABDOMINAL PAIN: 0
VOMITING: 0

## 2022-08-30 NOTE — PROGRESS NOTES
[address] [address]  Dept: [phone]  Loc: [phone]    Visit Date: 8/30/2022        HPI:     Florencia Alexander is a 69 y.o. male who presents today for:  Chief Complaint   Patient presents with    New Patient     Referred by Kathrine Ventura MD    Hematuria       HPI  Pt seen today in referral by Dr. Emelyn Seaman for microscopic hematuria. 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 Denies gross hematuria. Denies family hx of ca. Former smoker. Smoked 1/2 ppd for 2.5 years. Quit 1978. Uses snuff currently. No dysuria. Has some urgency and wakes ~ 1 x per night to urinate but no new worsening in urinary symptoms or irritative voiding. No dysuria. IPSS score in office today 9/35. Current Outpatient Medications   Medication Sig Dispense Refill    zoster recombinant adjuvanted vaccine (SHINGRIX) 50 MCG/0.5ML SUSR injection Inject 0.5 mLs into the muscle See Admin Instructions 1 dose now and repeat in 2-6 months 0.5 mL 0    primidone (MYSOLINE) 50 MG tablet Take 2 tablets by mouth in the morning and 2 tablets at noon and 2 tablets before bedtime. 540 tablet 3    atorvastatin (LIPITOR) 20 MG tablet Take 1 tablet by mouth in the morning. 90 tablet 3    metoprolol succinate (TOPROL XL) 25 MG extended release tablet Take 1 tablet by mouth in the morning. 30 tablet 5    Multiple Vitamins-Minerals (CENTRUM SILVER PO) Take 1 tablet by mouth daily       No current facility-administered medications for this visit. Past Medical History  Cassidy Clement  has a past medical history of Hyperlipidemia and Tremor. Past Surgical History  The patient  has no past surgical history on file. Family History  This patient's family history includes Cancer in his brother, brother, and father; Diabetes in his mother; Other in his brother.     Social History  Cassidy Clement  reports that he quit smoking about 44 years ago. His smoking use included cigarettes. He started smoking about 52 years ago. He has a 2.50 pack-year smoking history. His smokeless tobacco use includes snuff. He reports current alcohol use of about 4.0 - 5.0 standard drinks per week. He reports that he does not use drugs. Subjective:      Review of Systems   Constitutional:  Negative for activity change, appetite change, chills, diaphoresis, fatigue, fever and unexpected weight change. Gastrointestinal:  Negative for abdominal pain, nausea and vomiting. Genitourinary:  Positive for hematuria (micro only). Negative for decreased urine volume, difficulty urinating, dysuria, flank pain, frequency and urgency. Musculoskeletal:  Negative for back pain. Objective:   /68   Ht 5' 6\" (1.676 m)   Wt 201 lb (91.2 kg)   BMI 32.44 kg/m²     Physical Exam  Vitals reviewed. Constitutional:       General: He is not in acute distress. Appearance: Normal appearance. He is well-developed. He is not ill-appearing or diaphoretic. HENT:      Head: Normocephalic and atraumatic. Right Ear: External ear normal.      Left Ear: External ear normal.      Nose: Nose normal.      Mouth/Throat:      Mouth: Mucous membranes are moist.   Eyes:      General: No scleral icterus. Right eye: No discharge. Left eye: No discharge. Neck:      Vascular: No JVD. Trachea: No tracheal deviation. Cardiovascular:      Rate and Rhythm: Normal rate and regular rhythm. Heart sounds: Normal heart sounds. Pulmonary:      Effort: Pulmonary effort is normal. No respiratory distress. Breath sounds: Normal breath sounds. Abdominal:      General: There is no distension. Tenderness: There is no abdominal tenderness. There is no right CVA tenderness or left CVA tenderness. Musculoskeletal:         General: Normal range of motion. Skin:     General: Skin is warm and dry.    Neurological:      Mental Status: He is alert and oriented to benefits and desires to proceed. Send urine for bladder cx testing and schedule office cystoscopy with one of the physicians.

## 2022-09-28 ENCOUNTER — PROCEDURE VISIT (OUTPATIENT)
Dept: UROLOGY | Age: 67
End: 2022-09-28
Payer: MEDICARE

## 2022-09-28 VITALS
WEIGHT: 202 LBS | HEIGHT: 66 IN | DIASTOLIC BLOOD PRESSURE: 68 MMHG | SYSTOLIC BLOOD PRESSURE: 112 MMHG | BODY MASS INDEX: 32.47 KG/M2

## 2022-09-28 DIAGNOSIS — R31.29 MICROSCOPIC HEMATURIA: Primary | ICD-10-CM

## 2022-09-28 PROCEDURE — 52000 CYSTOURETHROSCOPY: CPT | Performed by: UROLOGY

## 2022-09-28 NOTE — PROGRESS NOTES
Dr. Anand Duque MD  Urologic Surgery        [address] [place] [address]. [place]  09/28/22    Patient:  Elia Nova  MRN: [id number]  YOB: 1955    Surgeon: Dr. Anand Duque MD  Assistant: None    Pre-op Diagnosis: Hematuria  Post-op Diagnosis: Same    Procedure:   Cystoscopy. Anesthesia: Local  Complications: None  OR Blood Loss: Minimal  Fluids: Cystalloids  Specimens: None    Indication:  49-year-old male with hematuria. CT scan was negative. Presents today for cystoscopy. Narrative of the Procedure:    After informed consent was obtained in the preoperative area, the patient was taken back to the operating room and remained on the hospital gurney. The patient was prepped and draped in a sterile manner. A time out occurred in which two patient identifiers were used. The flexible scope was carefully placed into the bladder. Findings:  Urethra: Normal.  No stricture or stenosis. Prostate: Moderate BPH. Bladder Neck: Normal  Papillary lesions: None  Trabeculations: Mild  Cellules/Diverticula: None  Bladder Stones: None  Ureteral Orifices: Normal    OVERALL IMPRESSION: Normal cystoscopy      Follow-Up: Normal cystoscopy.   Normal CT urogram.  Follow-up in 1 year with urinalysis    Anand Duque MD  Electronically signed on 9/28/2022 at 1:11 PM

## 2023-10-04 ENCOUNTER — OFFICE VISIT (OUTPATIENT)
Dept: UROLOGY | Age: 68
End: 2023-10-04
Payer: MEDICARE

## 2023-10-04 VITALS
SYSTOLIC BLOOD PRESSURE: 132 MMHG | HEIGHT: 66 IN | DIASTOLIC BLOOD PRESSURE: 78 MMHG | WEIGHT: 206 LBS | BODY MASS INDEX: 33.11 KG/M2

## 2023-10-04 DIAGNOSIS — N40.1 BPH WITH URINARY OBSTRUCTION: ICD-10-CM

## 2023-10-04 DIAGNOSIS — R31.29 MICROSCOPIC HEMATURIA: Primary | ICD-10-CM

## 2023-10-04 DIAGNOSIS — N13.8 BPH WITH URINARY OBSTRUCTION: ICD-10-CM

## 2023-10-04 PROCEDURE — 99214 OFFICE O/P EST MOD 30 MIN: CPT | Performed by: UROLOGY

## 2023-10-04 PROCEDURE — G8484 FLU IMMUNIZE NO ADMIN: HCPCS | Performed by: UROLOGY

## 2023-10-04 PROCEDURE — G8417 CALC BMI ABV UP PARAM F/U: HCPCS | Performed by: UROLOGY

## 2023-10-04 PROCEDURE — 3017F COLORECTAL CA SCREEN DOC REV: CPT | Performed by: UROLOGY

## 2023-10-04 PROCEDURE — G8427 DOCREV CUR MEDS BY ELIG CLIN: HCPCS | Performed by: UROLOGY

## 2023-10-04 PROCEDURE — 4004F PT TOBACCO SCREEN RCVD TLK: CPT | Performed by: UROLOGY

## 2023-10-04 PROCEDURE — 1123F ACP DISCUSS/DSCN MKR DOCD: CPT | Performed by: UROLOGY

## 2023-10-04 NOTE — PROGRESS NOTES
Dr. Nette Arriaga MD  [facility])  Urology Clinic      Patient:  Yari Kennedy  YOB: 1955  Date: 10/4/2023    HISTORY OF PRESENT ILLNESS:   The patient is a 77 y.o. male who presents today for follow-up for the following problem(s): micro hematuria. Negative CTU and local cysto in 2022. Stopped smoking in the 76s. CX bladder also negative. Overall the problem(s) : are stable. Associated Symptoms: No dysuria, gross hematuria. Pain Severity: 0/10    Summary of old records:   CTU and cysto 2023 negative. Imaging/Labs reviewed during today's visit:  I have independently reviewed and verified the following films during today's visit. None    Last several PSA's:  No results found for: \"PSA\"    Last total testosterone:  No results found for: \"TESTOSTERONE\"    Urinalysis today:  No results found for this visit on 10/04/23. Last BUN and creatinine:  Lab Results   Component Value Date    BUN 17 08/17/2022     Lab Results   Component Value Date    CREATININE 0.85 08/17/2022       Imaging Reviewed during this Office Visit:   (results were independently reviewed by physician and radiology report verified)    PAST MEDICAL, FAMILY AND SOCIAL HISTORY UPDATE:  Past Medical History:   Diagnosis Date    Hyperlipidemia     Tremor      No past surgical history on file.   Family History   Problem Relation Age of Onset    Diabetes Mother     Cancer Father         lymph nodes    Cancer Brother         melanoma    Other Brother         Kidney removed    Cancer Brother         kidney cancer     Outpatient Medications Marked as Taking for the 10/4/23 encounter (Office Visit) with Nette Arriaga MD   Medication Sig Dispense Refill    primidone (MYSOLINE) 50 MG tablet Take 2 tablets by mouth 3 times daily 540 tablet 3    metoprolol succinate (TOPROL XL) 25 MG extended release tablet Take 1 tablet by mouth daily 90 tablet 3    atorvastatin (LIPITOR) 20 MG tablet Take 1 tablet by mouth daily 90 tablet 3

## 2024-10-02 ENCOUNTER — OFFICE VISIT (OUTPATIENT)
Dept: UROLOGY | Age: 69
End: 2024-10-02
Payer: MEDICARE

## 2024-10-02 VITALS — HEIGHT: 66 IN | BODY MASS INDEX: 33.27 KG/M2 | RESPIRATION RATE: 15 BRPM | WEIGHT: 207 LBS

## 2024-10-02 DIAGNOSIS — R31.29 MICROSCOPIC HEMATURIA: Primary | ICD-10-CM

## 2024-10-02 DIAGNOSIS — N40.1 BPH WITH URINARY OBSTRUCTION: ICD-10-CM

## 2024-10-02 DIAGNOSIS — N13.8 BPH WITH URINARY OBSTRUCTION: ICD-10-CM

## 2024-10-02 PROCEDURE — G8484 FLU IMMUNIZE NO ADMIN: HCPCS | Performed by: UROLOGY

## 2024-10-02 PROCEDURE — 1123F ACP DISCUSS/DSCN MKR DOCD: CPT | Performed by: UROLOGY

## 2024-10-02 PROCEDURE — 3017F COLORECTAL CA SCREEN DOC REV: CPT | Performed by: UROLOGY

## 2024-10-02 PROCEDURE — G8427 DOCREV CUR MEDS BY ELIG CLIN: HCPCS | Performed by: UROLOGY

## 2024-10-02 PROCEDURE — G8417 CALC BMI ABV UP PARAM F/U: HCPCS | Performed by: UROLOGY

## 2024-10-02 PROCEDURE — 4004F PT TOBACCO SCREEN RCVD TLK: CPT | Performed by: UROLOGY

## 2024-10-02 PROCEDURE — 99214 OFFICE O/P EST MOD 30 MIN: CPT | Performed by: UROLOGY

## 2024-10-02 NOTE — PROGRESS NOTES
Dr. Donnell Wilkerson MD  [facility]  Urology Clinic      Patient:  Wil Schilling  YOB: 1955  Date: 10/2/2024    HISTORY OF PRESENT ILLNESS:   The patient is a 69 y.o. male who presents today for follow-up for the following problem(s): micro hematuria. Negative CTU and local cysto in 2022. Stopped smoking in the 70s. CX bladder also negative.     Overall the problem(s) : are stable.  Associated Symptoms: No dysuria, gross hematuria.  Pain Severity: 0/10    Summary of old records:   CTU and cysto 2023 negative.     Imaging/Labs reviewed during today's visit:  I have independently reviewed and verified the following films during today's visit.  None    Last several PSA's:  No results found for: \"PSA\"    Last total testosterone:  No results found for: \"TESTOSTERONE\"    Urinalysis today:  No results found for this visit on 10/02/24.    Last BUN and creatinine:  Lab Results   Component Value Date    BUN 14 10/09/2023     Lab Results   Component Value Date    CREATININE 0.91 10/09/2023       Imaging Reviewed during this Office Visit:   (results were independently reviewed by physician and radiology report verified)    PAST MEDICAL, FAMILY AND SOCIAL HISTORY UPDATE:  Past Medical History:   Diagnosis Date    Hyperlipidemia     Tremor      No past surgical history on file.  Family History   Problem Relation Age of Onset    Diabetes Mother     Cancer Father         lymph nodes    Cancer Brother         melanoma    Other Brother         Kidney removed    Cancer Brother         kidney cancer     Outpatient Medications Marked as Taking for the 10/2/24 encounter (Office Visit) with Donnell Wilkerson MD   Medication Sig Dispense Refill    metoprolol succinate (TOPROL XL) 25 MG extended release tablet Take 1 tablet by mouth once daily 90 tablet 0    atorvastatin (LIPITOR) 20 MG tablet Take 1 tablet by mouth once daily 90 tablet 0    primidone (MYSOLINE) 50 MG tablet Take 2 tablets by mouth 3 times daily 540